# Patient Record
(demographics unavailable — no encounter records)

---

## 2025-01-24 NOTE — HISTORY OF PRESENT ILLNESS
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  She had recent screening exam was told of abnormal EKG.  She gives a history of breast cancer prior chemotherapy included Adriamycin Cytoxan Taxol.  She had reported left ventricular dysfunction associated which she believes improved.  She is active physically exercising including running.  He gives no history of myocardial infarction or angina.  She has occasional palpitations feeling occasional skipped beat once or twice a week.  There is no history of syncope.  She denies hypertension or diabetes.  She has had thyroid nodules.  She takes no medication on a regular basis diet is of very good quality.  There is family history of early coronary disease involving both parents.  She had been a smoker from college years until mid 20s.  She had double cappuccino prior to the visit  She had COVID infection in September and had a prolonged cough.  She complains of some chest discomfort which she attributes to chronic cough.

## 2025-01-24 NOTE — DISCUSSION/SUMMARY
[FreeTextEntry1] : Discussed with her Mediterranean diet.  She will forward her lab testing additional lab testing requested as above echocardiography including strain and treadmill testing recommended.  Home blood pressure monitoring basal state reassess after above.  Defer evaluation of her palpitations as they do not bother her much at this time [EKG obtained to assist in diagnosis and management of assessed problem(s)] : EKG obtained to assist in diagnosis and management of assessed problem(s)

## 2025-01-24 NOTE — PHYSICAL EXAM
[Well Developed] : well developed [Well Nourished] : well nourished [No Acute Distress] : no acute distress [Normal Conjunctiva] : normal conjunctiva [Normal Venous Pressure] : normal venous pressure [No Carotid Bruit] : no carotid bruit [Normal S1, S2] : normal S1, S2 [No Murmur] : no murmur [No Rub] : no rub [No Gallop] : no gallop [Clear Lung Fields] : clear lung fields [Good Air Entry] : good air entry [No Respiratory Distress] : no respiratory distress  [Soft] : abdomen soft [Non Tender] : non-tender [No Masses/organomegaly] : no masses/organomegaly [Normal Bowel Sounds] : normal bowel sounds [Normal Gait] : normal gait [Gait - Sufficient for Exercise Testing] : gait - sufficient for exercise testing [No Edema] : no edema [No Cyanosis] : no cyanosis [No Clubbing] : no clubbing [Normal Radial B/L] : normal radial B/L [Normal PT B/L] : normal PT B/L [Normal DP B/L] : normal DP B/L [No Rash] : no rash [Moves all extremities] : moves all extremities [No Focal Deficits] : no focal deficits [Normal Speech] : normal speech [Alert and Oriented] : alert and oriented [Normal memory] : normal memory [de-identified] : Variable late systolic click

## 2025-01-24 NOTE — ASSESSMENT
[FreeTextEntry1] : 62-year-old woman prior chemotherapy with apparent chemo induced cardiomyopathy, IRBBB.  Status post COVID-19 infection with prolonged cough, chest discomfort associated.  10-year estimated cardiovascular risk 24%.  Family history of early heart disease.  Elevated blood pressure today which may in part be related to having had significant caffeine prior to the visit.  History of thyroid disease, breast cancer and chemotherapy.